# Patient Record
Sex: FEMALE | Race: WHITE | URBAN - METROPOLITAN AREA
[De-identification: names, ages, dates, MRNs, and addresses within clinical notes are randomized per-mention and may not be internally consistent; named-entity substitution may affect disease eponyms.]

---

## 2018-01-02 ENCOUNTER — OFFICE VISIT (OUTPATIENT)
Dept: OBGYN CLINIC | Age: 58
End: 2018-01-02

## 2018-01-02 VITALS
SYSTOLIC BLOOD PRESSURE: 139 MMHG | BODY MASS INDEX: 32.14 KG/M2 | WEIGHT: 200 LBS | HEIGHT: 66 IN | DIASTOLIC BLOOD PRESSURE: 85 MMHG | HEART RATE: 99 BPM

## 2018-01-02 DIAGNOSIS — L43.8 EROSIVE LICHEN PLANUS OF VULVA: ICD-10-CM

## 2018-01-02 DIAGNOSIS — N90.5 VULVAR ATROPHY: ICD-10-CM

## 2018-01-02 DIAGNOSIS — Z01.411 ENCOUNTER FOR GYNECOLOGICAL EXAMINATION WITH ABNORMAL FINDING: Primary | ICD-10-CM

## 2018-01-02 RX ORDER — CLOBETASOL PROPIONATE 0.5 MG/G
OINTMENT TOPICAL
Qty: 45 G | Refills: 3 | Status: SHIPPED | OUTPATIENT
Start: 2018-01-02

## 2018-01-02 NOTE — MR AVS SNAPSHOT
Visit Information Date & Time Provider Department Dept. Phone Encounter #  
 1/2/2018  3:20 PM Starr S Randell Guidry, Jennifer 90 [id number] Your Appointments 1/2/2018  3:20 PM  
ESTABLISHED PATIENT with [address], [place]  
[place] ([address]) Appt Note: AE/MAMMO DM pt  
 [address] [facility] [address] [place] [address] [phone]  
[facility] [address] Upcoming Health Maintenance Date Due Hepatitis C Screening 1960 FOBT Q 1 YEAR AGE 50-75 1/1/2009 PAP AKA CERVICAL CYTOLOGY 1/15/2016 Influenza Age 5 to Adult 8/1/2017 BREAST CANCER SCRN MAMMOGRAM 11/11/2017 Allergies as of 1/2/2018  Review Complete On: 1/2/2018 By: Paul Aragon Severity Noted Reaction Type Reactions Sulfa (Sulfonamide Antibiotics)  03/11/2014    Hives Current Immunizations  Never Reviewed No immunizations on file. Not reviewed this visit You Were Diagnosed With   
  
 Codes Comments Encounter for well woman exam with routine gynecological exam    -  Primary ICD-10-CM: X27.920 ICD-9-CM: V72.31 Vitals BP Pulse Height(growth percentile) Weight(growth percentile) LMP BMI  
 139/85 99 5' 6\" (1.676 m) 200 lb (90.7 kg) 03/05/2014 32.28 kg/m2 OB Status Smoking Status Postmenopausal Never Smoker Vitals History BMI and BSA Data Body Mass Index Body Surface Area  
 32.28 kg/m 2 2.06 m 2 Your Updated Medication List  
  
   
This list is accurate as of: 1/2/18  3:09 PM.  Always use your most recent med list.  
  
  
  
  
 escitalopram oxalate 10 mg tablet Commonly known as:  LEXAPRO  
  
 lisinopril 10 mg tablet Commonly known as:  Lisy Patel Take  by mouth daily. lisinopril-hydroCHLOROthiazide 20-12.5 mg per tablet Commonly known as:  Quique Common  
  
  
  
  
 Patient Instructions [facility] Desk: [phone] Well Visit, Women 48 to 72: Care Instructions Your Care Instructions Physical exams can help you stay healthy. Your doctor has checked your overall health and may have suggested ways to take good care of yourself. He or she also may have recommended tests. At home, you can help prevent illness with healthy eating, regular exercise, and other steps. Follow-up care is a key part of your treatment and safety. Be sure to make and go to all appointments, and call your doctor if you are having problems. It's also a good idea to know your test results and keep a list of the medicines you take. How can you care for yourself at home? · Reach and stay at a healthy weight. This will lower your risk for many problems, such as obesity, diabetes, heart disease, and high blood pressure. · Get at least 30 minutes of exercise on most days of the week. Walking is a good choice. You also may want to do other activities, such as running, swimming, cycling, or playing tennis or team sports. · Do not smoke. Smoking can make health problems worse. If you need help quitting, talk to your doctor about stop-smoking programs and medicines. These can increase your chances of quitting for good. · Protect your skin from too much sun. When you're outdoors from 10 a.m. to 4 p.m., stay in the shade or cover up with clothing and a hat with a wide brim. Wear sunglasses that block UV rays. Even when it's cloudy, put broad-spectrum sunscreen (SPF 30 or higher) on any exposed skin. · See a dentist one or two times a year for checkups and to have your teeth cleaned. · Wear a seat belt in the car. · Limit alcohol to 1 drink a day. Too much alcohol can cause health problems. Follow your doctor's advice about when to have certain tests. These tests can spot problems early. · Cholesterol.  Your doctor will tell you how often to have this done based on your age, family history, or other things that can increase your risk for heart attack and stroke. · Blood pressure. Have your blood pressure checked during a routine doctor visit. Your doctor will tell you how often to check your blood pressure based on your age, your blood pressure results, and other factors. · Mammogram. Ask your doctor how often you should have a mammogram, which is an X-ray of your breasts. A mammogram can spot breast cancer before it can be felt and when it is easiest to treat. · Pap test and pelvic exam. Ask your doctor how often you should have a Pap test. You may not need to have a Pap test as often as you used to. · Vision. Have your eyes checked every year or two or as often as your doctor suggests. Some experts recommend that you have yearly exams for glaucoma and other age-related eye problems starting at age 48. · Hearing. Tell your doctor if you notice any change in your hearing. You can have tests to find out how well you hear. · Diabetes. Ask your doctor whether you should have tests for diabetes. · Colon cancer. You should begin tests for colon cancer at age 48. You may have one of several tests. Your doctor will tell you how often to have tests based on your age and risk. Risks include whether you already had a precancerous polyp removed from your colon or whether your parents, sisters and brothers, or children have had colon cancer. · Thyroid disease. Talk to your doctor about whether to have your thyroid checked as part of a regular physical exam. Women have an increased chance of a thyroid problem. · Osteoporosis. You should begin tests for bone density at age 72. If you are younger than 72, ask your doctor whether you have factors that may increase your risk for this disease. You may want to have this test before age 72. · Heart attack and stroke risk. At least every 4 to 6 years, you should have your risk for heart attack and stroke assessed.  Your doctor uses factors such as your age, blood pressure, cholesterol, and whether you smoke or have diabetes to show what your risk for a heart attack or stroke is over the next 10 years. When should you call for help? Watch closely for changes in your health, and be sure to contact your doctor if you have any problems or symptoms that concern you. Where can you learn more? Go to http://faustino-kayla.info/. Enter X186 in the search box to learn more about \"Well Visit, Women 50 to 72: Care Instructions. \" Current as of: May 12, 2017 Content Version: 11.4 © 4894-0613 [employer]. Care instructions adapted under license by [employer] (which disclaims liability or warranty for this information). If you have questions about a medical condition or this instruction, always ask your healthcare professional. [address] any warranty or liability for your use of this information. Introducing [facility]! Dear Olga Martinez: Thank you for requesting a [employer] account. Our records indicate that you already have an active [employer] account. You can access your account anytime at [email or web address] Did you know that you can access your hospital and ER discharge instructions at any time in [employer]? You can also review all of your test results from your hospital stay or ER visit. Additional Information If you have questions, please visit the Frequently Asked Questions section of the [employer] website at [email or web address] Remember, [employer] is NOT to be used for urgent needs. For medical emergencies, dial 911. Now available from your iPhone and Android! Please provide this summary of care documentation to your next provider. Your primary care clinician is listed as Leno Russell. If you have any questions after today's visit, please call [phone].

## 2018-01-02 NOTE — PATIENT INSTRUCTIONS
[facility] Help Desk: [phone]       Well Visit, Women 48 to 72: Care Instructions  Your Care Instructions    Physical exams can help you stay healthy. Your doctor has checked your overall health and may have suggested ways to take good care of yourself. He or she also may have recommended tests. At home, you can help prevent illness with healthy eating, regular exercise, and other steps. Follow-up care is a key part of your treatment and safety. Be sure to make and go to all appointments, and call your doctor if you are having problems. It's also a good idea to know your test results and keep a list of the medicines you take. How can you care for yourself at home? · Reach and stay at a healthy weight. This will lower your risk for many problems, such as obesity, diabetes, heart disease, and high blood pressure. · Get at least 30 minutes of exercise on most days of the week. Walking is a good choice. You also may want to do other activities, such as running, swimming, cycling, or playing tennis or team sports. · Do not smoke. Smoking can make health problems worse. If you need help quitting, talk to your doctor about stop-smoking programs and medicines. These can increase your chances of quitting for good. · Protect your skin from too much sun. When you're outdoors from 10 a.m. to 4 p.m., stay in the shade or cover up with clothing and a hat with a wide brim. Wear sunglasses that block UV rays. Even when it's cloudy, put broad-spectrum sunscreen (SPF 30 or higher) on any exposed skin. · See a dentist one or two times a year for checkups and to have your teeth cleaned. · Wear a seat belt in the car. · Limit alcohol to 1 drink a day. Too much alcohol can cause health problems. Follow your doctor's advice about when to have certain tests. These tests can spot problems early. · Cholesterol.  Your doctor will tell you how often to have this done based on your age, family history, or other things that can increase your risk for heart attack and stroke. · Blood pressure. Have your blood pressure checked during a routine doctor visit. Your doctor will tell you how often to check your blood pressure based on your age, your blood pressure results, and other factors. · Mammogram. Ask your doctor how often you should have a mammogram, which is an X-ray of your breasts. A mammogram can spot breast cancer before it can be felt and when it is easiest to treat. · Pap test and pelvic exam. Ask your doctor how often you should have a Pap test. You may not need to have a Pap test as often as you used to. · Vision. Have your eyes checked every year or two or as often as your doctor suggests. Some experts recommend that you have yearly exams for glaucoma and other age-related eye problems starting at age 48. · Hearing. Tell your doctor if you notice any change in your hearing. You can have tests to find out how well you hear. · Diabetes. Ask your doctor whether you should have tests for diabetes. · Colon cancer. You should begin tests for colon cancer at age 48. You may have one of several tests. Your doctor will tell you how often to have tests based on your age and risk. Risks include whether you already had a precancerous polyp removed from your colon or whether your parents, sisters and brothers, or children have had colon cancer. · Thyroid disease. Talk to your doctor about whether to have your thyroid checked as part of a regular physical exam. Women have an increased chance of a thyroid problem. · Osteoporosis. You should begin tests for bone density at age 72. If you are younger than 72, ask your doctor whether you have factors that may increase your risk for this disease. You may want to have this test before age 72. · Heart attack and stroke risk. At least every 4 to 6 years, you should have your risk for heart attack and stroke assessed.  Your doctor uses factors such as your age, blood pressure, cholesterol, and whether you smoke or have diabetes to show what your risk for a heart attack or stroke is over the next 10 years. When should you call for help? Watch closely for changes in your health, and be sure to contact your doctor if you have any problems or symptoms that concern you. Where can you learn more? Go to http://faustino-kayla.info/. Enter F734 in the search box to learn more about \"Well Visit, Women 50 to 72: Care Instructions. \"  Current as of: May 12, 2017  Content Version: 11.4  © 6334-2833 [employer]. Care instructions adapted under license by [employer] (which disclaims liability or warranty for this information). If you have questions about a medical condition or this instruction, always ask your healthcare professional. [address] any warranty or liability for your use of this information.

## 2018-01-02 NOTE — PROGRESS NOTES
Annual exam ages 40-58    Franklin Man is a ,  60 y.o. female [facility] Patient's last menstrual period was 2014., who presents for her annual checkup. Since her last annual GYN exam about two years ago on 11/11/15,  she has the following changes in her health history:   - dx'd with lichen planus 15 months ago, oral, on bx. Having some vulvovaginal sx. ADDITIONAL CONCERNS:  She is having no significant problems. C/o vaginal dryness. With regard to the Gardasil vaccine, she is older than the age for which it is FDA approved. Menstrual status:    Her periods are absent in flow. She has gone through menopause. Contraception:    The current method of family planning is post menopausal status. Sexual history:     reports that she currently engages in sexual activity and has had male partners. She reports using the following method of birth control/protection: None. Pap and Mammogram History:    Her most recent Pap smear was normal, HPV was negative, obtained 5 year(s) ago on 1/15/13. The patient had her mammogram today in our office. Osteoporosis History:    Family history does not include a first or second degree relative with osteopenia or osteoporosis. Past Medical History:   Diagnosis Date    Hx of mammogram 2015    Negative    Lichen planus 9718    oral, dx'd with bx. Has vulvar sx.     Screening for malignant neoplasm of the cervix 1/15/13    Negative ; HPV Negative     Past Surgical History:   Procedure Laterality Date    HX DILATION AND CURETTAGE       OB History    Para Term  AB Living   5 4 4  1 4   SAB TAB Ectopic Molar Multiple Live Births   1     4      # Outcome Date GA Lbr Dilip/2nd Weight Sex Delivery Anes PTL Lv   5 Term 90 40w0d  6 lb 15.5 oz (3.161 kg) F VAGINAL DELI EPI N CHRISTOPHER   4 Term 86 40w0d  7 lb 15 oz (3.6 kg) F VAGINAL DELI EPI N CHRISTOPHER   3 Term 84 40w0d  6 lb 15.5 oz (3.161 kg) M VAGINAL DELI EPI N CHRISTOPHER   2 Term 10/27/82 40w0d  7 lb 4.5 oz (3.303 kg) M VAGINAL DELI EPI N CHRISTOPHER   1 SAB                   Current Outpatient Prescriptions   Medication Sig Dispense Refill    clobetasol (TEMOVATE) 0.05 % ointment Apply to affected area daily for 1 month, then every other day for 1 month, then continue 2x/wk for maintenance. 45 g 3    escitalopram oxalate (LEXAPRO) 10 mg tablet   1    lisinopril-hydrochlorothiazide (PRINZIDE, ZESTORETIC) 20-12.5 mg per tablet   0    lisinopril (PRINIVIL, ZESTRIL) 10 mg tablet Take  by mouth daily. Allergies: Sulfa (sulfonamide antibiotics)   Social History     Social History    Marital status:      Spouse name: N/A    Number of children: N/A    Years of education: N/A     Occupational History    Not on file. Social History Main Topics    Smoking status: Never Smoker    Smokeless tobacco: Never Used    Alcohol use No    Drug use: No    Sexual activity: Yes     Partners: Male     Birth control/ protection: None      Comment:      Other Topics Concern    Not on file     Social History Narrative     Tobacco History:  reports that she has never smoked. She has never used smokeless tobacco.  Alcohol Abuse:  reports that she does not drink alcohol. Drug Abuse:  reports that she does not use illicit drugs. There is no problem list on file for this patient.     Family History   Problem Relation Age of Onset    Elevated Lipids Mother     Cancer Father      Lung CA    Arthritis-osteo Sister     Thyroid Disease Sister      Hypothyroidism    Breast Cancer Neg Hx        Review of Systems - History obtained from the patient  Constitutional: negative for weight loss, fever, night sweats  HEENT: negative for hearing loss, earache, congestion, snoring, sorethroat  CV: negative for chest pain, palpitations, edema  Resp: negative for cough, shortness of breath, wheezing  GI: negative for change in bowel habits, abdominal pain, black or bloody stools  : negative for frequency, dysuria, hematuria, vaginal discharge  MSK: negative for back pain, joint pain, muscle pain  Breast: negative for breast lumps, nipple discharge, galactorrhea  Skin :negative for itching, rash, hives  Neuro: negative for dizziness, headache, confusion, weakness  Psych: negative for anxiety, depression, change in mood  Heme/lymph: negative for bleeding, bruising, pallor    Physical Exam    Visit Vitals    /85    Pulse 99    Ht 5' 6\" (1.676 m)    Wt 200 lb (90.7 kg)    LMP 03/05/2014    BMI 32.28 kg/m2       Constitutional  · Appearance: well-nourished, well developed, alert, in no acute distress    HENT  · Head and Face: appears normal    Neck  · Inspection/Palpation: normal appearance, no masses or tenderness  · Lymph Nodes: no lymphadenopathy present  · Thyroid: gland size normal, nontender, no nodules or masses present on palpation    Chest  · Respiratory Effort: breathing unlabored  · Auscultation: normal breath sounds    Cardiovascular  · Heart:  · Auscultation: regular rate and rhythm without murmur    Breasts  · Inspection of Breasts: breasts symmetrical, no skin changes, no discharge present, nipple appearance normal, no skin retraction present  · Palpation of Breasts and Axillae: no masses present on palpation, no breast tenderness  · Axillary Lymph Nodes: no lymphadenopathy present    Gastrointestinal  · Abdominal Examination: abdomen non-tender to palpation, normal bowel sounds, no masses present  · Liver and spleen: no hepatomegaly present, spleen not palpable  · Hernias: no hernias identified    Genitourinary  · External Genitalia: mild/mod atrophy with some scarring around urethra/clitoral vazquez (corresponds to area of sx)  · Vagina: normal vaginal vault without central or paravaginal defects, no discharge present, no inflammatory lesions present, no masses present; atrophic  · Bladder: non-tender to palpation  · Urethra: appears normal  · Cervix: normal   · Uterus: normal size, shape and consistency  · Adnexa: no adnexal tenderness present, no adnexal masses present  · Perineum: perineum within normal limits, no evidence of trauma, no rashes or skin lesions present  · Anus: anus within normal limits, no hemorrhoids present  · Inguinal Lymph Nodes: no lymphadenopathy present  RV:  No masses    Skin  · General Inspection: no rash, no lesions identified    Neurologic/Psychiatric  · Mental Status:  · Orientation: grossly oriented to person, place and time  · Mood and Affect: mood normal, affect appropriate        Assessment & Plan:  · Routine gynecologic examination. Pap/HPV done. · Lichen planus of vulva -> eRX clobetasol. RTO 3 months for recheck. · Some vulvovaginal atrophy. Discussed vaginal estrogen, Theo Soriano. Sexual health handout given. Will reassess sx at 3 month f/u, can add one of these is sx not adequately tx'd with clobetasol. · Counseled re: diet, exercise, healthy lifestyle  · Return for yearly wellness visits  · Rec annual mammogram. BIRAD-1 today in our offc. · Patient verbalized understanding           Orders Placed This Encounter    clobetasol (TEMOVATE) 0.05 % ointment     Sig: Apply to affected area daily for 1 month, then every other day for 1 month, then continue 2x/wk for maintenance. Dispense:  45 g     Refill:  3    PAP IG, HPV AND RFX HPV 66/64,26([id number])     Order Specific Question:   Pap Source? Answer:   Cervical and Endocervical     Order Specific Question:   Total Hysterectomy? Answer:   No     Order Specific Question:   Supracervical Hysterectomy? Answer:   No     Order Specific Question:   Post Menopausal?     Answer:   No     Order Specific Question:   Hormone Therapy? Answer:   No     Order Specific Question:   IUD? Answer:   No     Order Specific Question:   Abnormal Bleeding? Answer:   No     Order Specific Question:   Pregnant     Answer:   No     Order Specific Question:   Post Partum? Answer:    No

## 2018-01-05 LAB
CYTOLOGIST CVX/VAG CYTO: NORMAL
CYTOLOGY CVX/VAG DOC THIN PREP: NORMAL
CYTOLOGY HISTORY:: NORMAL
DX ICD CODE: NORMAL
HPV I/H RISK 1 DNA CVX QL PROBE+SIG AMP: NEGATIVE
Lab: NORMAL
OTHER STN SPEC: NORMAL
PATH REPORT.FINAL DX SPEC: NORMAL
STAT OF ADQ CVX/VAG CYTO-IMP: NORMAL

## 2018-03-07 ENCOUNTER — TELEPHONE (OUTPATIENT)
Dept: OBGYN CLINIC | Age: 58
End: 2018-03-07

## 2018-03-07 NOTE — TELEPHONE ENCOUNTER
Call received at 11:15am      60year old patient last seen in the office on 1/2/18. [facility] office of Dr. Xena Ramon, calling to ask about whether the patient has had her mammogram and pap smear done, ( ie date and records). This nurse advised the office that a fax request needs to be sent for the requested information and records per . The office was provided the office fax number of .

## 2018-03-07 NOTE — TELEPHONE ENCOUNTER
Nurse at [facility] calling back stating that the fax is not going through and I turned the fax machine off and restarted it. The nurse was very adamant about knowing the patient's charting information and I advised again we cannot release anything via phone. The nurse hung up.

## 2018-04-10 ENCOUNTER — OFFICE VISIT (OUTPATIENT)
Dept: OBGYN CLINIC | Age: 58
End: 2018-04-10

## 2018-04-10 VITALS
HEART RATE: 96 BPM | WEIGHT: 196 LBS | BODY MASS INDEX: 31.5 KG/M2 | SYSTOLIC BLOOD PRESSURE: 127 MMHG | HEIGHT: 66 IN | DIASTOLIC BLOOD PRESSURE: 77 MMHG

## 2018-04-10 DIAGNOSIS — L43.8 EROSIVE LICHEN PLANUS OF VULVA: Primary | ICD-10-CM

## 2018-04-10 DIAGNOSIS — N90.5 VULVAR ATROPHY: ICD-10-CM

## 2018-04-10 NOTE — PATIENT INSTRUCTIONS
Lichen Sclerosus: Care Instructions  Your Care Instructions  Lichen sclerosus is a long-term (chronic) skin problem that causes thin, wrinkled white patches. The patches are itchy and painful. If the skin tears, bright red or purple spots may appear. In most cases, it occurs on the skin of the anus (the opening where stool leaves the body), the vulva (the area around the vagina), and the tip of the penis in men who haven't been circumcised. Doctors aren't sure what causes lichen sclerosus. It isn't caused by an infection, and it's not contagious. You can't spread it to others. If the skin patches are on the anus, vulva, or penis, they may need to be treated. If these areas aren't treated, the skin can thicken and scar. This can narrow the openings to the vagina and anus. The foreskin over the penis may tighten and shrink. If this happens, going to the bathroom and having sex can be painful. Lichen sclerosus is usually treated with strong prescription cream or ointment. The medicine stops the inflammation, but the scarring of the skin might not completely go away. Men with scarring from advanced cases on the tip of the penis may have surgery to remove the foreskin. Skin patches on any other part of the body usually go away on their own without treatment. You may have a small increased risk of skin cancer on the affected area. Your doctor will examine the skin at least once a year. Follow-up care is a key part of your treatment and safety. Be sure to make and go to all appointments, and call your doctor if you are having problems. It's also a good idea to know your test results and keep a list of the medicines you take. How can you care for yourself at home? · Be safe with medicines. If your doctor prescribed a cream, apply it exactly as directed. Call your doctor if you think you are having a problem with your medicine. · Put cold, wet cloths on the area to reduce itching. · Wear loose-fitting clothes. Avoid nylon and other fabric that holds moisture close to the skin. This may allow an infection to start. · If your doctor told you to use nonprescription moisturizing cream on your skin, read and follow the directions on the label. Care tips for women  · Do not douche, unless your doctor tells you to. · Avoid hot baths. Don't use soaps or bath products to wash the area around your vulva. Rinse with water only, and gently pat the area dry. Care tips for men  · Keep your penis clean. If you haven't been circumcised, gently pull the foreskin back to wash your penis with warm water. Make sure your penis is dry before you get dressed. When should you call for help? Call your doctor now or seek immediate medical care if:  ? · You have symptoms of infection, such as:  ¨ Increased pain, swelling, warmth, or redness. ¨ Red streaks leading from the area. ¨ Pus draining from the area. ¨ A fever. ? Watch closely for changes in your health, and be sure to contact your doctor if:  ? · The affected area grows or changes. ? · You do not get better as expected. Where can you learn more? Go to http://faustino-kayla.info/. Enter C274 in the search box to learn more about \"Lichen Sclerosus: Care Instructions. \"  Current as of: October 13, 2016  Content Version: 11.4  © 9707-8151 [employer]. Care instructions adapted under license by [employer] (which disclaims liability or warranty for this information). If you have questions about a medical condition or this instruction, always ask your healthcare professional. [place] [address] any warranty or liability for your use of this information.

## 2018-04-10 NOTE — PROGRESS NOTES
Problem Visit-Complete    Chief Complaint   Vulvar Abnormaility and Follow-up      HPI  Gloria Chase is a 60 y.o. female who presents for f/u for lichen planus. Patient's last menstrual period was 03/05/2014. Seen for AE in January. At that visit reported dx with oral lichen planus. Was having vulvar sx as well - irritation, \"pulling\" sensation around clitoris, as well as dyspareunia. Was started on Clobetasol. Now using 2x/wk. Reports resolution of irritation and pulling sensation. Has not been sexually active since last visit. At that visit we also discussed tx for vulvovaginal atrophy. Declined at that time. Not having significant sx currently. Past Medical History:   Diagnosis Date    Hx of mammogram 01/02/2018    Negative    Lichen planus 9646    oral, dx'd with bx. Has vulvar sx.  Screening for malignant neoplasm of the cervix 01/02/2018    Negative ; HPV Negative     Past Surgical History:   Procedure Laterality Date    HX DILATION AND CURETTAGE       Social History     Occupational History    Not on file. Social History Main Topics    Smoking status: Never Smoker    Smokeless tobacco: Never Used    Alcohol use No    Drug use: No    Sexual activity: Yes     Partners: Male     Birth control/ protection: None      Comment:      Family History   Problem Relation Age of Onset    Elevated Lipids Mother     Cancer Father      Lung CA    Arthritis-osteo Sister     Thyroid Disease Sister      Hypothyroidism    Breast Cancer Neg Hx        Allergies   Allergen Reactions    Sulfa (Sulfonamide Antibiotics) Hives     Prior to Admission medications    Medication Sig Start Date End Date Taking? Authorizing Provider   clobetasol (TEMOVATE) 0.05 % ointment Apply to affected area daily for 1 month, then every other day for 1 month, then continue 2x/wk for maintenance.  1/2/18  Yes Lemuel Pierce MD   escitalopram oxalate (LEXAPRO) 10 mg tablet  10/13/15  Yes Historical Provider lisinopril-hydrochlorothiazide (PRINZIDE, ZESTORETIC) 20-12.5 mg per tablet  9/8/15  Yes Historical Provider   lisinopril (PRINIVIL, ZESTRIL) 10 mg tablet Take  by mouth daily. Historical Provider        Objective:  Visit Vitals    /77    Pulse 96    Ht 5' 6\" (1.676 m)    Wt 196 lb (88.9 kg)    LMP 03/05/2014    BMI 31.64 kg/m2       Physical Exam:     Constitutional  · Appearance: well-nourished, well developed, alert, in no acute distress    Genitourinary  · External Genitalia: fairly significant atrophy. Some scarring over clitoral vazquez, no discoloration or hypopigmentation. Skin  · General Inspection: no rash, no lesions identified    Neurologic/Psychiatric  · Mental Status:  · Orientation: grossly oriented to person, place and time  · Mood and Affect: mood normal, affect appropriate    Assessment:  Lichen planus. Excellent response to Clobetasol. Vulvovaginal atrophy, not significantly symptomatic at this time. H/o dyspareunia. Plan:   - Cont clobetasol 2x/wk for next 6 months, then can decr to once weekly  - Reviewed tx options for VVA (estrogen, Osphena, Intrarosa, vaginal moisturizers). Declines RX today. RTO if wishes to discuss further.  - AE due in January. Will reassess lichen planus then. Consider decreasing to less potent steroid.

## 2018-09-26 ENCOUNTER — TELEPHONE (OUTPATIENT)
Dept: OBGYN CLINIC | Age: 58
End: 2018-09-26

## 2018-09-26 NOTE — TELEPHONE ENCOUNTER
Patient's pcp ([facility]) office calling for medical records, advised that we would need a medical records request faxed, our fax number given.